# Patient Record
Sex: FEMALE | Race: BLACK OR AFRICAN AMERICAN | NOT HISPANIC OR LATINO | URBAN - METROPOLITAN AREA
[De-identification: names, ages, dates, MRNs, and addresses within clinical notes are randomized per-mention and may not be internally consistent; named-entity substitution may affect disease eponyms.]

---

## 2021-01-28 ENCOUNTER — OFFICE VISIT (OUTPATIENT)
Dept: URBAN - METROPOLITAN AREA CLINIC 115 | Facility: CLINIC | Age: 71
End: 2021-01-28
Payer: MEDICARE

## 2021-01-28 DIAGNOSIS — K74.69 CIRRHOSIS, CRYPTOGENIC: ICD-10-CM

## 2021-01-28 DIAGNOSIS — B17.10 HEPATITIS C: ICD-10-CM

## 2021-01-28 DIAGNOSIS — R10.84 ABDOMINAL CRAMPING, GENERALIZED: ICD-10-CM

## 2021-01-28 DIAGNOSIS — R10.9 ABDOMINAL PAIN: ICD-10-CM

## 2021-01-28 DIAGNOSIS — K74.60 CIRRHOSIS: ICD-10-CM

## 2021-01-28 DIAGNOSIS — R93.89 ABNORMAL CT SCAN: ICD-10-CM

## 2021-01-28 DIAGNOSIS — B18.2 CARRIER OF VIRAL HEPATITIS C: ICD-10-CM

## 2021-01-28 DIAGNOSIS — R10.13 DYSPEPSIA: ICD-10-CM

## 2021-01-28 PROBLEM — 162031009 DYSPEPSIA: Status: ACTIVE | Noted: 2021-01-28

## 2021-01-28 PROCEDURE — 1036F TOBACCO NON-USER: CPT | Performed by: INTERNAL MEDICINE

## 2021-01-28 PROCEDURE — G8427 DOCREV CUR MEDS BY ELIG CLIN: HCPCS | Performed by: INTERNAL MEDICINE

## 2021-01-28 PROCEDURE — 99244 OFF/OP CNSLTJ NEW/EST MOD 40: CPT | Performed by: INTERNAL MEDICINE

## 2021-01-28 PROCEDURE — G8417 CALC BMI ABV UP PARAM F/U: HCPCS | Performed by: INTERNAL MEDICINE

## 2021-01-28 PROCEDURE — G9903 PT SCRN TBCO ID AS NON USER: HCPCS | Performed by: INTERNAL MEDICINE

## 2021-01-28 PROCEDURE — 99203 OFFICE O/P NEW LOW 30 MIN: CPT | Performed by: INTERNAL MEDICINE

## 2021-01-28 RX ORDER — PANTOPRAZOLE SODIUM 40 MG/1
1 TABLET TABLET, DELAYED RELEASE ORAL ONCE A DAY
Status: ACTIVE | COMMUNITY

## 2021-01-28 RX ORDER — OMEPRAZOLE 40 MG/1
1 CAPSULE 30 MINUTES BEFORE MORNING MEAL CAPSULE, DELAYED RELEASE ORAL ONCE A DAY
Status: ACTIVE | COMMUNITY

## 2021-01-28 RX ORDER — LEVOTHYROXINE SODIUM 0.07 MG/1
1 TABLET IN THE MORNING ON AN EMPTY STOMACH TABLET ORAL ONCE A DAY
Status: ACTIVE | COMMUNITY

## 2021-01-28 RX ORDER — PANTOPRAZOLE SODIUM 40 MG/1
1 TABLET TABLET, DELAYED RELEASE ORAL ONCE A DAY
Qty: 90 | Refills: 1 | OUTPATIENT
Start: 2021-01-28

## 2021-01-28 RX ORDER — DICYCLOMINE HYDROCHLORIDE 10 MG/1
1 TABLET CAPSULE ORAL THREE TIMES A DAY
Qty: 90 | Refills: 1 | OUTPATIENT
Start: 2021-01-28 | End: 2021-03-29

## 2021-01-28 RX ORDER — ALLOPURINOL 100 MG/1
1 TABLET TABLET ORAL ONCE A DAY
Status: ACTIVE | COMMUNITY

## 2021-01-28 RX ORDER — AMLODIPINE BESYLATE 5 MG/1
1 TABLET TABLET ORAL ONCE A DAY
Status: ACTIVE | COMMUNITY

## 2021-01-28 NOTE — HPI-OTHER HISTORIES
10/27/2014  Ms. Serrano is a pleasant, 64-year-old female with a  history of hepatitis C and cirrhosis, who is coming in to establish care with a GI doctor.  She has  had a long history of hepatitis C, was told about 6 years ago that she had cirrhosis based on  imaging.  She apparently was treated for hep C many years ago with interferon and ribavirin,  however, she could not tolerate therapy because of significant neutropenia, and therapy was  stopped, and she has not been on therapy since.  The patient has not had any complications from  her cirrhosis, however, she has not had an upper endoscopy in about 5 years, and her last imaging  of the right upper quadrant was 3 years ago.    02/02/2015 This is a scheduled follow-up appointment for this patient, a 64 year old /Black female, after a previous visit on 10/27/14, for an evaluation for hepatitis C. The patient was diagnosed with hepatitis C years ago.  She is asymptomatic. The patient denies having HIV, hepatitis B, renal failure, and an immunocompromised state. She denies alcohol use. she denies illegal drug use presently.  The patient has had previous evaluation for hepatitis C. The patient has been previously treated for hepatitis C. She was treated with pegylated interferon and ribavirin for months. She did not complete the course of treatment. She did not complete the course of treatment due to side effects of treatment.

## 2021-01-28 NOTE — HPI-TODAY'S VISIT:
01/28/2021 Patient is a 70 year old  female who presents on referral from Dr. Bacilio De La Paz for a colon screening and evaluation of stomach pain and bloating. She has history of Hepatits C and underwent Harvoni treatment. Last colonoscopy was in 10/24/2019 at [facility]. CT scan in 2018 showed 1 polyp in hepatic flexure. Last EGD was in 2018. Recents labs showed liver enzymes were normal.PET scan from June 2017 showed enlarged spleen.   She c/o of abdominal pain, nocturnal symptoms, which causes her coughing when she lies down. She reports belching but denies heartburn. No fever, chills, constipation, or diarrhea. The pain is intermittent which feels like gas pain and spasms. It makes her feel hot and nauseous. No previous stomach operations.  She has had cirrhosis since 2009 due to Hepatitis C. She underwent Harvoni treatment and no longer has Hep C.

## 2021-01-29 ENCOUNTER — TELEPHONE ENCOUNTER (OUTPATIENT)
Dept: URBAN - METROPOLITAN AREA CLINIC 115 | Facility: CLINIC | Age: 71
End: 2021-01-29

## 2021-01-29 LAB
A/G RATIO: 1.5
AFP, SERUM, TUMOR MARKER: 4.9
ALBUMIN: 4.7
ALKALINE PHOSPHATASE: 89
ALT (SGPT): 30
AST (SGOT): 24
BASO (ABSOLUTE): 0.1
BASOS: 1
BILIRUBIN, TOTAL: 0.4
BUN/CREATININE RATIO: 19
BUN: 20
CALCIUM: 9.6
CARBON DIOXIDE, TOTAL: 25
CHLORIDE: 100
CREATININE: 1.08
EGFR IF AFRICN AM: 59
EGFR IF NONAFRICN AM: 51
EOS (ABSOLUTE): 0
EOS: 1
GLOBULIN, TOTAL: 3.2
GLUCOSE: 95
HEMATOCRIT: 34.2
HEMATOLOGY COMMENTS:: (no result)
HEMOGLOBIN: 11.4
IMMATURE CELLS: (no result)
IMMATURE GRANS (ABS): 0
IMMATURE GRANULOCYTES: 0
LYMPHS (ABSOLUTE): 1.7
LYMPHS: 25
MCH: 30.6
MCHC: 33.3
MCV: 92
MONOCYTES(ABSOLUTE): 0.4
MONOCYTES: 6
NEUTROPHILS (ABSOLUTE): 4.6
NEUTROPHILS: 67
NRBC: (no result)
PLATELETS: 130
POTASSIUM: 4.3
PROTEIN, TOTAL: 7.9
RBC: 3.72
RDW: 13.3
SODIUM: 140
WBC: 6.8

## 2021-02-01 ENCOUNTER — LAB OUTSIDE AN ENCOUNTER (OUTPATIENT)
Dept: URBAN - METROPOLITAN AREA CLINIC 115 | Facility: CLINIC | Age: 71
End: 2021-02-01

## 2021-02-01 ENCOUNTER — LAB OUTSIDE AN ENCOUNTER (OUTPATIENT)
Dept: URBAN - METROPOLITAN AREA CLINIC 82 | Facility: CLINIC | Age: 71
End: 2021-02-01

## 2021-02-01 LAB
CREATININE POC: 1
PERFORMING LAB: (no result)

## 2021-02-08 ENCOUNTER — DASHBOARD ENCOUNTERS (OUTPATIENT)
Age: 71
End: 2021-02-08

## 2021-02-18 ENCOUNTER — OFFICE VISIT (OUTPATIENT)
Dept: URBAN - METROPOLITAN AREA TELEHEALTH 2 | Facility: TELEHEALTH | Age: 71
End: 2021-02-18
Payer: MEDICARE

## 2021-02-18 DIAGNOSIS — K58.9 IRRITABLE BOWEL SYNDROME, UNSPECIFIED TYPE: ICD-10-CM

## 2021-02-18 DIAGNOSIS — B17.10 HEPATITIS C: ICD-10-CM

## 2021-02-18 DIAGNOSIS — R93.89 ABNORMAL CT SCAN: ICD-10-CM

## 2021-02-18 DIAGNOSIS — K74.60 CIRRHOSIS: ICD-10-CM

## 2021-02-18 PROBLEM — 50711007 HEPATITIS C: Status: ACTIVE | Noted: 2021-01-28

## 2021-02-18 PROBLEM — 10743008: Status: ACTIVE | Noted: 2021-02-18

## 2021-02-18 PROBLEM — 129679001: Status: ACTIVE | Noted: 2021-01-28

## 2021-02-18 PROCEDURE — 99214 OFFICE O/P EST MOD 30 MIN: CPT | Performed by: INTERNAL MEDICINE

## 2021-02-18 PROCEDURE — G8417 CALC BMI ABV UP PARAM F/U: HCPCS | Performed by: INTERNAL MEDICINE

## 2021-02-18 RX ORDER — PANTOPRAZOLE SODIUM 40 MG/1
1 TABLET TABLET, DELAYED RELEASE ORAL ONCE A DAY
Qty: 90 | Refills: 1 | COMMUNITY
Start: 2021-01-28

## 2021-02-18 RX ORDER — DICYCLOMINE HYDROCHLORIDE 10 MG/1
1 TABLET CAPSULE ORAL THREE TIMES A DAY
Qty: 270 TABLET | Refills: 1 | OUTPATIENT
Start: 2021-02-18 | End: 2021-08-17

## 2021-02-18 RX ORDER — ALLOPURINOL 100 MG/1
1 TABLET TABLET ORAL ONCE A DAY
COMMUNITY

## 2021-02-18 RX ORDER — DICYCLOMINE HYDROCHLORIDE 10 MG/1
1 TABLET CAPSULE ORAL THREE TIMES A DAY
Qty: 90 | Refills: 1 | COMMUNITY
Start: 2021-01-28 | End: 2021-03-29

## 2021-02-18 RX ORDER — OMEPRAZOLE 40 MG/1
1 CAPSULE 30 MINUTES BEFORE MORNING MEAL CAPSULE, DELAYED RELEASE ORAL ONCE A DAY
COMMUNITY

## 2021-02-18 RX ORDER — AMLODIPINE BESYLATE 5 MG/1
1 TABLET TABLET ORAL ONCE A DAY
COMMUNITY

## 2021-02-18 RX ORDER — PANTOPRAZOLE SODIUM 40 MG/1
1 TABLET TABLET, DELAYED RELEASE ORAL ONCE A DAY
COMMUNITY

## 2021-02-18 RX ORDER — LEVOTHYROXINE SODIUM 0.07 MG/1
1 TABLET IN THE MORNING ON AN EMPTY STOMACH TABLET ORAL ONCE A DAY
COMMUNITY

## 2021-02-18 NOTE — HPI-TODAY'S VISIT:
01/28/2021 Patient is a 70 year old  female who presents on referral from Dr. Bacilio De La Paz for a colon screening and evaluation of stomach pain and bloating. She has history of Hepatits C and underwent Harvoni treatment. Last colonoscopy was in 10/24/2019 at [facility]. CT scan in 2018 showed 1 polyp in hepatic flexure. Last EGD was in 2018. Recents labs showed liver enzymes were normal.PET scan from June 2017 showed enlarged spleen.   She c/o of abdominal pain, nocturnal symptoms, which causes her coughing when she lies down. She reports belching but denies heartburn. No fever, chills, constipation, or diarrhea. The pain is intermittent which feels like gas pain and spasms. It makes her feel hot and nauseous. No previous stomach operations.  She has had cirrhosis since 2009 due to Hepatitis C. She underwent Harvoni treatment and no longer has Hep C.   02/18/2021 Patient is seen via telehealth. Recent labs showed liver enzymes were normal, AFP is normal. Her kidney function is slightly low. MRI showed cirrhosis of the liver, spots on the spleen were benign. She says taking dicyclomine has improved her stomach pain and IBS. She has not taken acid reflux medicine as it was not helping her. Currently she is no longer experiencing stomach pain.

## 2021-02-25 ENCOUNTER — OFFICE VISIT (OUTPATIENT)
Dept: URBAN - METROPOLITAN AREA CLINIC 115 | Facility: CLINIC | Age: 71
End: 2021-02-25

## 2021-02-25 RX ORDER — OMEPRAZOLE 40 MG/1
1 CAPSULE 30 MINUTES BEFORE MORNING MEAL CAPSULE, DELAYED RELEASE ORAL ONCE A DAY
Status: ACTIVE | COMMUNITY

## 2021-02-25 RX ORDER — ALLOPURINOL 100 MG/1
1 TABLET TABLET ORAL ONCE A DAY
Status: ACTIVE | COMMUNITY

## 2021-02-25 RX ORDER — PANTOPRAZOLE SODIUM 40 MG/1
1 TABLET TABLET, DELAYED RELEASE ORAL ONCE A DAY
Qty: 90 | Refills: 1 | Status: ACTIVE | COMMUNITY
Start: 2021-01-28

## 2021-02-25 RX ORDER — AMLODIPINE BESYLATE 5 MG/1
1 TABLET TABLET ORAL ONCE A DAY
Status: ACTIVE | COMMUNITY

## 2021-02-25 RX ORDER — PANTOPRAZOLE SODIUM 40 MG/1
1 TABLET TABLET, DELAYED RELEASE ORAL ONCE A DAY
Status: ACTIVE | COMMUNITY

## 2021-02-25 RX ORDER — DICYCLOMINE HYDROCHLORIDE 10 MG/1
1 TABLET CAPSULE ORAL THREE TIMES A DAY
Qty: 90 | Refills: 1 | Status: ACTIVE | COMMUNITY
Start: 2021-01-28 | End: 2021-03-29

## 2021-02-25 RX ORDER — LEVOTHYROXINE SODIUM 0.07 MG/1
1 TABLET IN THE MORNING ON AN EMPTY STOMACH TABLET ORAL ONCE A DAY
Status: ACTIVE | COMMUNITY